# Patient Record
Sex: FEMALE | Race: WHITE | URBAN - METROPOLITAN AREA
[De-identification: names, ages, dates, MRNs, and addresses within clinical notes are randomized per-mention and may not be internally consistent; named-entity substitution may affect disease eponyms.]

---

## 2024-02-08 ENCOUNTER — HOME HEALTH ADMISSION (OUTPATIENT)
Age: 79
End: 2024-02-08
Payer: MEDICARE

## 2024-02-09 ENCOUNTER — HOME CARE VISIT (OUTPATIENT)
Age: 79
End: 2024-02-09

## 2024-02-12 ENCOUNTER — HOME CARE VISIT (OUTPATIENT)
Age: 79
End: 2024-02-12

## 2024-02-13 ENCOUNTER — HOME CARE VISIT (OUTPATIENT)
Age: 79
End: 2024-02-13

## 2024-02-13 VITALS
SYSTOLIC BLOOD PRESSURE: 138 MMHG | TEMPERATURE: 98 F | RESPIRATION RATE: 18 BRPM | OXYGEN SATURATION: 95 % | DIASTOLIC BLOOD PRESSURE: 78 MMHG | HEART RATE: 96 BPM

## 2024-02-13 PROCEDURE — G0299 HHS/HOSPICE OF RN EA 15 MIN: HCPCS

## 2024-02-13 PROCEDURE — 0221000100 HH NO PAY CLAIM PROCEDURE

## 2024-02-14 ENCOUNTER — HOME CARE VISIT (OUTPATIENT)
Age: 79
End: 2024-02-14

## 2024-02-14 VITALS
TEMPERATURE: 97.6 F | RESPIRATION RATE: 16 BRPM | SYSTOLIC BLOOD PRESSURE: 132 MMHG | OXYGEN SATURATION: 99 % | HEART RATE: 92 BPM | DIASTOLIC BLOOD PRESSURE: 62 MMHG

## 2024-02-14 PROCEDURE — G0151 HHCP-SERV OF PT,EA 15 MIN: HCPCS

## 2024-02-14 NOTE — HOME HEALTH
Summary of clinical health condition  Skilled services/Home bound verification: 78 y.o. female patient without new ortho complaints status post ORIF left olecranon fracture, DOS 1/7/24, and IMN of left intertrochanteric femur fracture, DOS 1/6/24. Pt was D/C from [facility] [place] and referred to [facility] for continuing care for disease and medication management, PT, OT for strengthening and conditioning.    This patient is homebound for the following reasons Requires considerable and taxing effort to leave the home  and Only leaves the home for medical reasons or Mu-ism services and are infrequent and of short duration for other reasons.    Caregiver: caregiver /friend is available to assist with daily meals, ADL's prn, provide reminders with daily medication, daughter  run errands and accompany to MD appt prn.  Skilled care provided: Teaching disease and medication management, Completed assessment,  L femur and L ulna fx surgical site completely healed.  Completed [facility] admission, discussed POC, PT, OT eval/treat, SNV freq and D/C plan.   Patient response to procedure performed: Pt tolerated well during the assessment procedure with no c/o.    Medications reviewed and all medications are available at home. The following education was provided regarding medications, medication interactions, and look a like medications: N/A, all med's reviewed. Discussed importance of timely taking all prescribed med's, proper dosage and freq. Discussed s/e with Hydrocodone; constipation, diarrhea, drowsiness, dizziness, confusion, lightheaded ness, nausea.  Pt to take Tylenol with less pain and taking pain medication on timely basis to achieve good pain relief. To call MD with uncontrollable pain. Medications are somewhat effective at this time.     Medications are somewhat effective at this time.    High risk medication teaching regarding anticoagulants, hyperglycemic agents or opioid narcotics performed (specify): Opioid teaching

## 2024-02-15 NOTE — HOME HEALTH
S: patient reports that she has a cane when she is ready to try to transition to the cane  O: PAIN: patient is taking pain medication on schedule, educated patient on use of ice for pain and edema  patient to apply ice to the left elbow and left hip/leg  for pain and swelling control per MD protocol using a barrier to protect the skin. Patient to monitor the skin for any adverse effects such as color changes, irritation, mottled appearance. Patient to use ice for 20 minutes an hour for the first 2-3 days and then can reduce to 20 minutes 4-5 times a day.  WOUND:incisions are healed  ROM: L hip flexion 68 degrees in standing   L elbow extension -35 degrees  L elbow flexion 125 degrees  STRENGTH: R knee ext 5/5, R knee flexion 4+/5, R hip flex 4+/5, R hip abd/adduction 4+/5  L knee flexion 4-/5 and extension 4-/5, L hip flexion 3-/5 abduction and adduction 3-/5  BED MOBILITY:patient is able to get in and out of the bed with intermittent use of a leg . when fatigued or with increased pain, she needs to use the  to get the left LE up on the bed   she is mod I with supine to sit  EQUIPMENT: FWW, commode  TRANSFERS: patient completed sit to stand from the bed, recliner (lift chair) and the toilet (with 3 in 1 on top) with L LE partially extended out in front of her, weight shift to the right, and B UE push off - she maintains the right sided weight shift on initial stance and uses the walker for standing balance and to further reduce weight bearing through the left leg  GAIT: ambulating with FWW with antalgic pattern with reduced weight on L LE (weight on UEs), left step length slightly bigger than right, slight reduction in left terminal knee extension on initial contact and slight reduction in left stance time. she ambulated in the home for a total of 50 feet with SBA for safety.  RESPONSE TO TREATMENT: patient demonstrated a positive outcome post treatment and reported no increase in pain, increased comfort

## 2024-02-17 ENCOUNTER — HOME CARE VISIT (OUTPATIENT)
Age: 79
End: 2024-02-17

## 2024-02-17 VITALS
DIASTOLIC BLOOD PRESSURE: 70 MMHG | HEART RATE: 87 BPM | SYSTOLIC BLOOD PRESSURE: 122 MMHG | OXYGEN SATURATION: 98 % | TEMPERATURE: 98.8 F

## 2024-02-17 PROCEDURE — G0151 HHCP-SERV OF PT,EA 15 MIN: HCPCS

## 2024-02-17 PROCEDURE — G0157 HHC PT ASSISTANT EA 15: HCPCS

## 2024-02-17 NOTE — HOME HEALTH
SUBJECTIVE: Patient reports she feels well denies complaints.  Patient asks what is her fall risk.  CAREGIVER INVOLVEMENT/ASSISTANCE NEEDED FOR: ADLs, meal asustance, med support, transportation, shopping, quang=usehold chores..  HOME HEALTH SUPPLIES BY TYPE AND QUANTITY ORDERED/DELIVERED THIS VISIT INCLUDE: n/a  OBJECTIVE:  See interventions.  PATIENT RESPONSE TO TREATMENT:  Patient participated in all activity and cooperated without complaint.      PATIENT LEVEL OF UNDERSTANDING OF EDUCATION PROVIDED:   Educated patient re: Purpose of the exercises, frequency and proper technique. Ed re: fall prevention, gait training, transfer training.    Patient verbalized understading.    ASSESSMENT OF PROGRESS TOWARD GOALS:   Patient demonstrates improvement in standing balane ad gait, is at a high fall risk and is able to use the walker with the platform.  She is walking through home without difficulty,  She is progressing toward readiness to use the cane; however, due to her osteoporosis and high fall risk, is not yet ready to progress to a LRAD such as the cane.  She demonstrated improving L quads and leg strength as demonstrated by SLR without use of the leg  to support the leg.  Transfers improving and SBA, supervision for safety.  CONTINUED NEED FOR THE FOLLOWING SKILLS: HH PT is n=medically necessary in rider to instruct the patient in HEP, progress her in strength, balance and mobility to restore independent level of function, reduce fall risk and support proper healing of the surgical leg.HH PT will monitor patient origress and adapt treatments per patient response.  PLAN FOR NEXT VISIT: Next visit:  address standing at counter, side stepping, and gait: even step lengths, pre-cane exercises using kitchen counter.  THE FOLLOWING DISCHARGE PLANNING WAS DISCUSSED WITH THE PATIENT/CAREGIVER:  Continue HH PT for 3w2, 2w1, Discharge patient to self, CG and physician when goals are met or max potential achieved.

## 2024-02-19 ENCOUNTER — HOME CARE VISIT (OUTPATIENT)
Age: 79
End: 2024-02-19
Payer: MEDICARE

## 2024-02-19 PROCEDURE — G0157 HHC PT ASSISTANT EA 15: HCPCS

## 2024-02-20 ENCOUNTER — HOME CARE VISIT (OUTPATIENT)
Age: 79
End: 2024-02-20
Payer: MEDICARE

## 2024-02-20 VITALS — DIASTOLIC BLOOD PRESSURE: 68 MMHG | HEART RATE: 88 BPM | OXYGEN SATURATION: 99 % | SYSTOLIC BLOOD PRESSURE: 126 MMHG

## 2024-02-20 PROCEDURE — G0300 HHS/HOSPICE OF LPN EA 15 MIN: HCPCS

## 2024-02-20 NOTE — HOME HEALTH
Skilled reason for visit: Education    Caregiver involvement: friend.    Medications reviewed and all medications are available in the home this visit.    The following education was provided regarding medications:  Continue to take medication as ordered.    MD notified of any discrepancies/look a-like medications/medication interactions: n/a  Medications are effective at this time.      Home health supplies by type and quantity ordered/delivered this visit include: n/a    Patient education provided this visit: See Interventions    Sharps education provided: n/a    Patient level of understanding of education provided: ALert and Orientated    Patient response to procedure performed:  n/a    Agency Progress toward goals: Continued Education     Patient's Progress towards personal goals: Patient states she feels fine    Home exercise program: Fall Prevention    Continued need for the following skills: Nursing.    Plan for next visit: Education    Patient and/or caregiver notified and agrees to changes in the Plan of Care: Yes.     The following discharge planning was discussed with the pt/caregiver: Plan to discharge once goals are met.

## 2024-02-21 ENCOUNTER — HOME CARE VISIT (OUTPATIENT)
Age: 79
End: 2024-02-21
Payer: MEDICARE

## 2024-02-21 VITALS
DIASTOLIC BLOOD PRESSURE: 64 MMHG | HEART RATE: 104 BPM | OXYGEN SATURATION: 93 % | TEMPERATURE: 100.3 F | RESPIRATION RATE: 16 BRPM | SYSTOLIC BLOOD PRESSURE: 110 MMHG

## 2024-02-21 PROCEDURE — G0152 HHCP-SERV OF OT,EA 15 MIN: HCPCS

## 2024-02-21 PROCEDURE — G0157 HHC PT ASSISTANT EA 15: HCPCS

## 2024-02-22 VITALS
RESPIRATION RATE: 16 BRPM | TEMPERATURE: 98.7 F | DIASTOLIC BLOOD PRESSURE: 70 MMHG | HEART RATE: 83 BPM | SYSTOLIC BLOOD PRESSURE: 118 MMHG | OXYGEN SATURATION: 97 %

## 2024-02-23 ENCOUNTER — HOME CARE VISIT (OUTPATIENT)
Age: 79
End: 2024-02-23
Payer: MEDICARE

## 2024-02-23 PROCEDURE — G0157 HHC PT ASSISTANT EA 15: HCPCS

## 2024-02-24 ENCOUNTER — HOME CARE VISIT (OUTPATIENT)
Age: 79
End: 2024-02-24
Payer: MEDICARE

## 2024-02-24 PROCEDURE — G0299 HHS/HOSPICE OF RN EA 15 MIN: HCPCS

## 2024-02-26 ENCOUNTER — HOME CARE VISIT (OUTPATIENT)
Age: 79
End: 2024-02-26
Payer: MEDICARE

## 2024-02-26 VITALS
RESPIRATION RATE: 14 BRPM | SYSTOLIC BLOOD PRESSURE: 118 MMHG | TEMPERATURE: 97.8 F | HEART RATE: 84 BPM | OXYGEN SATURATION: 98 % | DIASTOLIC BLOOD PRESSURE: 68 MMHG

## 2024-02-26 PROCEDURE — G0157 HHC PT ASSISTANT EA 15: HCPCS

## 2024-02-26 ASSESSMENT — ENCOUNTER SYMPTOMS: PAIN LOCATION - PAIN QUALITY: ACHE

## 2024-02-26 NOTE — HOME HEALTH
Caregiver involvement: CG is Friend she is staying with, available 24/7 and she .Assists with ADLs, Medication management, Transportation to appointments, Meal prep and assists with ambulation.        Medications reconciled and all medications are  available in the home this visit.  The following education was provided regarding medications, All medications should be given the same time daily. Medications  are effective at this time.      Home health supplies by type and quantity ordered/delivered this visit include: n/a    Patient education provided this visit: Patient is a fall risk, I recommend supervision at all times, keeping walk ways/halls clear of clutter.  Be observant for s/s of infection. Eat a high protein healthy diet to promote continued healing.          Progress toward goals: Wound is well approximated and healing well, open to air.  No s/s of infection.   Home exercise program:  Work with other disciplines to increase endurance and strength. Needs the assistance of one other for safe ambulation.  F/U with PCP and surgeon as instructed.  Take all medications as prescribed.      Continued need for the following skills: PT/OT    The following discharge planning was discussed with the pt/caregiver:  Will discharge from nursing at this juncture, continue with other therapies.  PCP told of Skilled nursing discipline discharge.

## 2024-02-26 NOTE — CASE COMMUNICATION
SN Disipline Discharge done on 2/24/24.  Patient wound is well approximated and open to air.  Education is completed.

## 2024-02-27 ENCOUNTER — HOME CARE VISIT (OUTPATIENT)
Age: 79
End: 2024-02-27
Payer: MEDICARE

## 2024-02-27 VITALS
RESPIRATION RATE: 16 BRPM | DIASTOLIC BLOOD PRESSURE: 60 MMHG | HEART RATE: 90 BPM | TEMPERATURE: 98.7 F | OXYGEN SATURATION: 94 % | OXYGEN SATURATION: 97 % | HEART RATE: 96 BPM | HEART RATE: 92 BPM | RESPIRATION RATE: 16 BRPM | OXYGEN SATURATION: 98 % | TEMPERATURE: 99.1 F | SYSTOLIC BLOOD PRESSURE: 110 MMHG | TEMPERATURE: 98.9 F | RESPIRATION RATE: 16 BRPM | DIASTOLIC BLOOD PRESSURE: 70 MMHG | DIASTOLIC BLOOD PRESSURE: 68 MMHG | SYSTOLIC BLOOD PRESSURE: 108 MMHG | SYSTOLIC BLOOD PRESSURE: 120 MMHG

## 2024-02-27 VITALS
SYSTOLIC BLOOD PRESSURE: 126 MMHG | DIASTOLIC BLOOD PRESSURE: 82 MMHG | RESPIRATION RATE: 18 BRPM | OXYGEN SATURATION: 98 % | HEART RATE: 90 BPM | TEMPERATURE: 97.5 F

## 2024-02-27 PROCEDURE — G0158 HHC OT ASSISTANT EA 15: HCPCS

## 2024-02-27 ASSESSMENT — ENCOUNTER SYMPTOMS
PAIN LOCATION - PAIN QUALITY: ACHE

## 2024-02-27 NOTE — HOME HEALTH
Free Insurance-paid transportation to medical appointments:  [employer]: [phone]    Free Grocery Store:    [address] [address],     [phone]    Hours  Open today    02:00 pm - 04:00 pm    Tuesdays: 2pm - 4pm    Thursdays: 2pm - 4pm    If times listed doesn't work for you; call [phone] for an appointment.   SUBJECTIVE: Patient appears in good spirits.  CAREGIVER INVOLVEMENT/ASSISTANCE NEEDED FOR: Patient is recovering at a friend's home  has some assist with meals, housekeeping, errands and transportation her daughter is able to offer some assist as well  HOME HEALTH SUPPLIES BY TYPE AND QUANTITY ORDERED/DELIVERED THIS VISIT INCLUDE: None  OBJECTIVE:  See interventions.  PATIENT RESPONSE TO TREATMENT:  Good-no increase in pain reported  PATIENT LEVEL OF UNDERSTANDING OF EDUCATION PROVIDED: Patient instructed with opioid meds, pain mgmt and fall risk prevention-good response  ASSESSMENT OF PROGRESS TOWARD GOALS:Iinstruction to maintain a wide JEAN and to slowdown with turns with FWW with platform attachment on the L to decrease fall risk.  Patient able to improve turns and to \"look up\" with reinforcement of cues during gait-Sup.  Sit to stand with R UE to push up-Sup. Progressing well towards goals-needs increased confidence during mobility.  Encouraging slow steady motions with exercises and corrected B hip extension  CONTINUED NEED FOR THE FOLLOWING SKILLS: Patient will be seen 2 x week for Physical Therapy to continue to work toward goals within POC and HHPT is medically necessary to address  dx and clinical findings: decreased ROM, decreased strength, impaired gait, decreased ability w stair negotiation, increased swelling, decreased bed mobility, decreased transfer status, decreased endurance, decreased balance and decreased safety, increased pain in order to improve functional mobility/quality of life, decrease burden of care, reduce risk for re-hospitalization, work towards patient's personal goals of return to PLOF w decrease risk for falls.  PLAN FOR NEXT VISIT: Gait/transfer training, strengthening exercises  THE FOLLOWING DISCHARGE PLANNING WAS DISCUSSED WITH THE PATIENT/CAREGIVER: This is visit number 3  out of 10   planned visits.  NEXT MD APPT:GARRISON

## 2024-02-27 NOTE — HOME HEALTH
:SUBJECTIVE: Patient is very happy to trial her cane.    CAREGIVER INVOLVEMENT/ASSISTANCE NEEDED FOR: Patient is recovering at a friend's home and has her daughter available at times/weekends-ofeer any assist as needed    HOME HEALTH SUPPLIES BY TYPE AND QUANTITY ORDERED/DELIVERED THIS VISIT INCLUDE: None    OBJECTIVE:  See interventions.    PATIENT RESPONSE TO TREATMENT:  Good response to treatment-reports fatigue however no reported increase in pain    PATIENT LEVEL OF UNDERSTANDING OF EDUCATION PROVIDED: Good response to instruction with Opioid meds, pain mgmt, and fall risk prevention.      ASSESSMENT OF PROGRESS TOWARD GOALS:Working to advance patient to single point cane to increae functional mobility-continuing to utilize FWW with L platform attachment as primary AD due to low confidence an patient's fear of falling.  However patient would like the cane to be her primary AD eventually.  Patient is able to transfer safely with cane in hand when standing or sitting from slightly raised sit to stand chair (patient's preferred sittng surface.) Progressing towards goals.  CONTINUED NEED FOR THE FOLLOWING SKILLS: Patient will be seen 3 x week for Physical Therapy to continue to work toward goals within POC and HHPT is medically necessary to address  dx and clinical findings: decreased ROM, decreased strength, impaired gait, decreased ability w stair negotiation, increased swelling, decreased bed mobility, decreased transfer status, decreased endurance, decreased balance and decreased safety, increased pain in order to improve functional mobility/quality of life, decrease burden of care, reduce risk for re-hospitalization, work towards patient's personal goals of return to PLOF w decrease risk for falls.    PLAN FOR NEXT VISIT: Gait/transfer training, strengthening exercises    THE FOLLOWING DISCHARGE PLANNING WAS DISCUSSED WITH THE PATIENT/CAREGIVER: This is visit number 5  out of  10  planned visits.    NEXT MD

## 2024-02-27 NOTE — HOME HEALTH
SUBJECTIVE: Patient appears in good spirits-she reports she isnot yet confident enough to go home-she lives alone  CAREGIVER INVOLVEMENT/ASSISTANCE NEEDED FOR: Patient is recovering at a friends home and has her daughter available at times/weekends-ofeer any assist as needed  HOME HEALTH SUPPLIES BY TYPE AND QUANTITY ORDERED/DELIVERED THIS VISIT INCLUDE: None  OBJECTIVE:  See interventions.  PATIENT RESPONSE TO TREATMENT:  Good response to treatment-reports fatiguehowever not increase in pain  PATIENT LEVEL OF UNDERSTANDING OF EDUCATION PROVIDED: Good response to instruction with Opioid meds, pain mgmt, and fall risk prevention  ASSESSMENT OF PROGRESS TOWARD GOALS: Patient able to increase AROM of L hip from 68 degrees in standing to 90 degrees in standing meeting goal.  Gait training within home on even surfaces-initally guarded and shorter step lengths then with instruction, time and distance -she is able to have each foot pass the other and increased consistency with B Heel strike for foot clearance with reinforcement and guidance- 100' x 1 with Sup.  Sit to stand is more consistent and able to be done on first attenpt with electric sit to stand chair slightly raised, wide JEAN and R uE to push up-patient is limited with L UE-Sup. Good tolerance of standing exercises with support-SBA. Progressing towards goals.  CONTINUED NEED FOR THE FOLLOWING SKILLS: Patient will be seen 3 x week for Physical Therapy to continue to work toward goals within POC and HHPT is medically necessary to address  dx and clinical findings: decreased ROM, decreased strength, impaired gait, decreased ability w stair negotiation, increased swelling, decreased bed mobility, decreased transfer status, decreased endurance, decreased balance and decreased safety, increased pain in order to improve functional mobility/quality of life, decrease burden of care, reduce risk for re-hospitalization, work towards patient's personal goals of return to

## 2024-02-27 NOTE — HOME HEALTH
SUBJECTIVE: Patient reports she feels she is slowy getting her strength back but she does not yet feel secure with the cane without someone by her  CAREGIVER INVOLVEMENT/ASSISTANCE NEEDED FOR: Patient is staying with her friend while recovering   HOME HEALTH SUPPLIES BY TYPE AND QUANTITY ORDERED/DELIVERED THIS VISIT INCLUDE: none  OBJECTIVE:  See interventions.  PATIENT RESPONSE TO TREATMENT:  Good response No increase in pain reported  PATIENT LEVEL OF UNDERSTANDING OF EDUCATION PROVIDED: Patient instructed with pain  mgmt and fall risk prevention  ASSESSMENT OF PROGRESS TOWARD GOALS: Gait training with cane on even and uneven surfaces close SBA-200' low confidence at first then able to relax more with time and distance,and reinforcement-slowly improving.  Transfers with cane in hand are improving-Sup.  Feel repetition and instruction will increase confiendence and decreased guarding to allow patient to reach goals for improved functional mobility  CONTINUED NEED FOR THE FOLLOWING SKILLS: Patient will be seen 3 x week for Physical Therapy to continue to work toward goals within POC and HHPT is medically necessary to address  dx and clinical findings: decreased ROM, decreased strength, impaired gait, decreased ability w stair negotiation, increased swelling, decreased bed mobility, decreased transfer status, decreased endurance, decreased balance and decreased safety, increased pain in order to improve functional mobility/quality of life, decrease burden of care, reduce risk for re-hospitalization, work towards patient's personal goals of return to PLOF w decrease risk for falls.  PLAN FOR NEXT VISIT: Gait training with SPC on uneven surfaces and car transfers-review of standing exercises  THE FOLLOWING DISCHARGE PLANNING WAS DISCUSSED WITH THE PATIENT/CAREGIVER: This is visit number 6  out of 10  planned visits.  NEXT MD APPT:Patient sees Orthop for L hip in April and on March 9th sees Orthop for her L arm

## 2024-02-28 NOTE — HOME HEALTH
SUBJECTIVE: Pt answered door with platform walker upon arrival, reported she has been doing well and has no pain and her biggest concern is being able to prepare meals.  CAREGIVER ASSISTANCE NEEDED FOR: pts friend present for assistance as needed.  MEDICATIONS REVIEWED AND UPDATED: no medication changes reported this visit  .  OBJECTIVE: see interventions  PATIENT RESPONSE TO TREATMENT: Pt demonstrated positive response to treatment with no increased pain and good participation.  .  PATIENT/CAREGIVER EDUCATION PROVIDED THIS VISIT:  Therapist educated pt on UB HEP, safety with LUE mindy techniques and educated on use of walker basket for IADLs until safer with cane and cleared for LUE WB.  PATIENT LEVEL OF UNDERSTANDING OF EDUCATION PROVIDED: Pt verbalized good understanding and demonstrated good teachback with HEP.  ASSESSMENT AND PROGRESS TOWARD GOALS: Pt demonstrated good progress towards ADL, HEP and transfer goals with decreased assistance to perform.  PLAN: next visit will be for IADL training at walker level with use of walker tray.   DISCHARGE PLANNING DISCUSSED: Discharge to self and family under MD supervision once all goals have been met or patient has reached maximum potential. Frequency remaininw1, 2w1, 1w1 remaining.

## 2024-03-01 ENCOUNTER — HOME CARE VISIT (OUTPATIENT)
Age: 79
End: 2024-03-01
Payer: MEDICARE

## 2024-03-01 VITALS
TEMPERATURE: 97.7 F | HEART RATE: 93 BPM | RESPIRATION RATE: 16 BRPM | DIASTOLIC BLOOD PRESSURE: 74 MMHG | SYSTOLIC BLOOD PRESSURE: 120 MMHG | OXYGEN SATURATION: 97 %

## 2024-03-01 PROCEDURE — G0157 HHC PT ASSISTANT EA 15: HCPCS

## 2024-03-01 PROCEDURE — G0158 HHC OT ASSISTANT EA 15: HCPCS

## 2024-03-01 ASSESSMENT — ENCOUNTER SYMPTOMS: PAIN LOCATION - PAIN QUALITY: ACHING, SORE

## 2024-03-02 VITALS
OXYGEN SATURATION: 99 % | SYSTOLIC BLOOD PRESSURE: 104 MMHG | RESPIRATION RATE: 14 BRPM | TEMPERATURE: 99 F | HEART RATE: 89 BPM | DIASTOLIC BLOOD PRESSURE: 60 MMHG

## 2024-03-02 NOTE — HOME HEALTH
SUBJECTIVE: Patient reports she is going to wait until MD appt for her L UE before deciding when to return home-patient lives alone    CAREGIVER INVOLVEMENT/ASSISTANCE NEEDED FOR: Patient is staying with her friend while recovering     HOME HEALTH SUPPLIES BY TYPE AND QUANTITY ORDERED/DELIVERED THIS VISIT INCLUDE: none    OBJECTIVE:  See interventions.    PATIENT RESPONSE TO TREATMENT:  Good response No increase in pain reported    PATIENT LEVEL OF UNDERSTANDING OF EDUCATION PROVIDED: Patient instructed with pain  mgmt and fall risk prevention    ASSESSMENT OF PROGRESS TOWARD GOALS: Patient is able to transfer in/out of her friend's car with Mod I after instruction by return demonstration.  Patient able to amb 200' with Sup to \"look up\" to increase overall balance -patient able to improve following focus and instruction. Patient sit tostand must push up with R UE and has slightly wide JEAN-not able to utilize L UE yet to pushup as per MD.  She continues to be reserved regarding the use of cane vs FWW-instructed  on importance of fall risk prevention.  Progressing towards goals and incresed functional independence.  CONTINUED NEED FOR THE FOLLOWING SKILLS: Patient will be seen 3 x week for Physical Therapy to continue to work toward goals within POC and HHPT is medically necessary to address  dx and clinical findings: decreased ROM, decreased strength, impaired gait, decreased ability w stair negotiation, increased swelling, decreased bed mobility, decreased transfer status, decreased endurance, decreased balance and decreased safety, increased pain in order to improve functional mobility/quality of life, decrease burden of care, reduce risk for re-hospitalization, work towards patient's personal goals of return to PLOF w decrease risk for falls.    PLAN FOR NEXT VISIT: Gait training with SPC on uneven surfaces as weather permits, strengthening exercises, Tinetti test    THE FOLLOWING DISCHARGE PLANNING WAS DISCUSSED

## 2024-03-02 NOTE — HOME HEALTH
SUBJECTIVE: Pt seated on couch upon arrival, reported she had some soreness from exercises but had been doing well and was able to make a sandwich on her own yesterday.  CAREGIVER ASSISTANCE NEEDED FOR: pts friend she is staying with home if needed.  MEDICATIONS REVIEWED AND UPDATED: no medication changes reported this visit  .  OBJECTIVE: see interventions  PATIENT RESPONSE TO TREATMENT: Pt demonstrated positive response to treatment with no increased pain and good participation.  .  PATIENT/CAREGIVER EDUCATION PROVIDED THIS VISIT:  Therapist educated pt on body mechanics for shower entry to reduce need for CGA from daughter and upgrades for HEP as tolerated.  PATIENT LEVEL OF UNDERSTANDING OF EDUCATION PROVIDED: Pt demonstrated good teachback and verbalized understanding of carry over.  ASSESSMENT AND PROGRESS TOWARD GOALS: Pt demonstrated good progress towards ADL, transfer and HEP goals with decreased assistance to perform.  PLAN: next visit will be for IADL training and assessment in kitchen for meal prep.   DISCHARGE PLANNING DISCUSSED: Discharge to self and family under MD supervision once all goals have been met or patient has reached maximum potential. Frequency remaininw1, 1w1 remaining.

## 2024-03-04 ENCOUNTER — HOME CARE VISIT (OUTPATIENT)
Age: 79
End: 2024-03-04
Payer: MEDICARE

## 2024-03-04 VITALS
TEMPERATURE: 98 F | DIASTOLIC BLOOD PRESSURE: 66 MMHG | RESPIRATION RATE: 18 BRPM | SYSTOLIC BLOOD PRESSURE: 128 MMHG | HEART RATE: 82 BPM | OXYGEN SATURATION: 99 %

## 2024-03-04 PROCEDURE — G0157 HHC PT ASSISTANT EA 15: HCPCS

## 2024-03-04 PROCEDURE — G0158 HHC OT ASSISTANT EA 15: HCPCS

## 2024-03-04 NOTE — HOME HEALTH
SUBJECTIVE: Pt in recliner upon arrival, reported she had been to her home yesterday and navigated well with the cane. Pt stated she has been doing well with all self-care, making her own food and getting around well.  CAREGIVER ASSISTANCE NEEDED FOR: pts friend home during visit for assistance if needed  MEDICATIONS REVIEWED AND UPDATED: no medication changes reported  .  OBJECTIVE: see interventions  PATIENT RESPONSE TO TREATMENT: Pt demonstrated positive response to treatment with no increased pain and good participation.  .  PATIENT/CAREGIVER EDUCATION PROVIDED THIS VISIT:  Therapist educated pt on safety with kitchen and laundry tasks, use of rollator and walker basket as needed.  PATIENT LEVEL OF UNDERSTANDING OF EDUCATION PROVIDED: Pt demonstrated good teachback and verbalized understanding of carry over in home environment.  ASSESSMENT AND PROGRESS TOWARD GOALS: Pt demonstrated good progress towards all goals performing I/ADLs, transfers and HEP with mod I and good safety, use of AD and decreased v/c. Pt requesting no second visit this week due to doing so well and will be discharged next week.  PLAN: next visit will be for OTDC per pt request.   DISCHARGE PLANNING DISCUSSED: Discharge to self and family under MD supervision once all goals have been met or patient has reached maximum potential. Frequency remaininw1 remaining with pt aware of OTDC next week and no second visit this week at her request.  .  .  Assessment and Summary of Care:  Patient's current functional status before discharge is as follows   Strength/ROM: Pt demonstrated I teachback of UB HEP using worksheet as guide and reported daily carry over. Pt will see ortho on 3/7 to assess WB through LE but is using I otherwise.  GOAL MET  ADLs: Pt able to demo teachback of UB/LB bathing, dressing, grooming and toileting mod I with good use of LHAD and report that daughter is no longer assisting at all at shower level. Pt has improved modified

## 2024-03-06 ENCOUNTER — HOME CARE VISIT (OUTPATIENT)
Age: 79
End: 2024-03-06
Payer: MEDICARE

## 2024-03-06 VITALS
HEART RATE: 86 BPM | OXYGEN SATURATION: 98 % | DIASTOLIC BLOOD PRESSURE: 62 MMHG | RESPIRATION RATE: 16 BRPM | SYSTOLIC BLOOD PRESSURE: 120 MMHG | TEMPERATURE: 98.4 F

## 2024-03-06 ASSESSMENT — ENCOUNTER SYMPTOMS: PAIN LOCATION - PAIN QUALITY: ACHE

## 2024-03-06 NOTE — HOME HEALTH
SUBJECTIVE: Patient reports she is considering to return home once she sees the MD and can put more WB on her L UE.  She went home with her daughter this weekend and she reports she had no difficulty entering and exiting her home utilzing her cane.    CAREGIVER INVOLVEMENT/ASSISTANCE NEEDED FOR: Patient is staying with her friend while recovering     HOME HEALTH SUPPLIES BY TYPE AND QUANTITY ORDERED/DELIVERED THIS VISIT INCLUDE: none    OBJECTIVE:  See interventions.    PATIENT RESPONSE TO TREATMENT:  Good response No increase in pain reported    PATIENT LEVEL OF UNDERSTANDING OF EDUCATION PROVIDED:Educational goals met.    ASSESSMENT OF PROGRESS TOWARD GOALS: Tinetti test score today 25/28.  Sit to stand from slightly raised electric sit to stand chair with R UE to push up to stand with wider JEAN.  Patient is able to amb within home with cane with good quality from Sup to Mod I.  Patient is in agreement with discharge next visit and is uncertain whether ishe is returning home.  Friend states she is doing very little for patient at this point. Made good progress overall.    Assessment and Summary of Care:  Patient's current functional status before discharge is as follows  Strength: N/T  ROM:  N/T  Bed Mobility: Independent  Transfers: Sit to stand with slightly raised chair  with L UE to push up; from bed Mod I and from car Mod I  Gait/WC mobility: 200' on even and uneven surfaces with the cane Sup with good quality  Stairs: Mod I over threshold step  Special Tests: Tinetti test 25/28  Recommendations: Patient is considering returning to her own home depending on her results from her MD appt.   CONTINUED NEED FOR THE FOLLOWING SKILLS: Patient will be seen 3 x week for Physical Therapy to continue to work toward goals within POC and HHPT is medically necessary to address  dx and clinical findings: decreased ROM, decreased strength, impaired gait, decreased ability w stair negotiation, increased swelling, decreased

## 2024-03-06 NOTE — CASE COMMUNICATION
Assessment and Summary of Care:  Patient's current functional status before discharge is as follows  Strength: N/T  ROM:  N/T  Bed Mobility: Independent  Transfers: Sit to stand with slightly raised chair  with L UE to push up; from bed Mod I and from car Mod I  Gait/WC mobility: 200' on even and uneven surfaces with the cane Sup with good quality  Stairs: Mod I over threshold step  Special Tests: Tinetti test 25/28  Recommendations: Juve castano is considering returning to her own home depending on her results from her MD appt.

## 2024-03-07 ENCOUNTER — HOME CARE VISIT (OUTPATIENT)
Age: 79
End: 2024-03-07
Payer: MEDICARE

## 2024-03-07 VITALS
DIASTOLIC BLOOD PRESSURE: 70 MMHG | OXYGEN SATURATION: 99 % | TEMPERATURE: 98.4 F | SYSTOLIC BLOOD PRESSURE: 120 MMHG | RESPIRATION RATE: 16 BRPM | HEART RATE: 85 BPM

## 2024-03-07 PROCEDURE — G0151 HHCP-SERV OF PT,EA 15 MIN: HCPCS

## 2024-03-08 NOTE — HOME HEALTH
S: patient reports that she is hoping to move back to her house  O: PAIN: patient reports pain is well controlled and is currently a 0/10  WOUND:incisions are healed  ROM: L hip flexion 68 degrees in standing AT EVALUATION  L elbow extension -35 degrees AT EVALUATION  L elbow flexion 125 degrees AT EVALUATION  AT DISCHARGE L HIP FLEXION TO 90 DEGREES IN STANDING   STRENGTH: R knee ext 5/5, R knee flexion 4+/5, R hip flex 4+/5, R hip abd/adduction 4+/5 AT EVALUATION  L knee flexion 4-/5 and extension 4-/5, L hip flexion 3-/5 abduction and adduction 3-/5 AT EVALUATION  R knee ext 5/5, R knee flexion 5/5, R hip flex 5/5, R hip abd/adduction 5/5 AT DISCHARGE  L knee flexion 5/5 and extension 5/5, L hip flexion 5/5 abduction and adduction 5/5 AT DISCHARGE  BED MOBILITY:independent  EQUIPMENT: FWW, cane, commode  TRANSFERS:independent with all transfers in the home and to/from the car  GAIT: She is ambulating with a FWW with a L platform attachment on even and uneven surfaces with modified independence. She is also safe and independent ambulating on even surfaces with a single point cane. She does demonstrate forward flexed posture which partially improves with cues. She is cautious with all movements and ambulation. She continues to improve her confidence with transfers and gait.RESPONSE TO TREATMENT: patient demonstrated a positive outcome post treatment and reported no increase in pain, increased comfort and increased confidence with transfers and mobility. Patient reported good understanding of the HEP and reports confidence in ability to complete the program as prescribed by PT   RESPONSE TO EDUCATION: patient was educated on: safety, fall risk, HEP  Patient expressed understanding of all education provided and was able to repeat back education, precautions, and HEP.  A:ASSESSMENT AND PROGRESS TOWARD GOALS:  Patient has been seen for home care PT following a L femur fracture with ORIF and a L ulna fracture. She has

## 2024-03-13 ENCOUNTER — HOME CARE VISIT (OUTPATIENT)
Age: 79
End: 2024-03-13
Payer: MEDICARE

## 2024-03-13 VITALS
OXYGEN SATURATION: 97 % | SYSTOLIC BLOOD PRESSURE: 108 MMHG | TEMPERATURE: 98.5 F | HEART RATE: 95 BPM | RESPIRATION RATE: 17 BRPM | DIASTOLIC BLOOD PRESSURE: 60 MMHG

## 2024-03-13 PROCEDURE — G0152 HHCP-SERV OF OT,EA 15 MIN: HCPCS

## 2024-03-13 NOTE — HOME HEALTH
Caregiver involvement: Pt's daughter lives locally and provides emotional support as needed.    Medications reviewed and all medications are available in the home this visit.    The following education was provided regarding medications, medication interactions, and look alike medications (specify): Continue as directed by MD.    Medications  are effective at this time.      Patient education provided this visit: reviewed energy conservation during ADL and IADL with pt.  Review of HEP    Sharps education provided:  na    Patient level of understanding of education provided: pt able to verbalize compliance with sitting during functional tasks, taking frequent rest breaks and keeping frequently used items within reach.  She has copy of HEP in home and reports daily compliance.    Skilled Care Performed this visit: OT reassessment/dc    Patient response to procedure performed:  Ms. Rowley had a positive response to therapy.  She denies having pain with ambulation or ADL.  Vitals were within therapy parameters.    Patient's Progress towards personal goals: all goals met. Pt is mod I with all ADL and IADL at the cane level.    Continued need for the following skills: agency d/c    Discharge Plans:  DC OT no further skilled OT indicated at this time.  MD office notified.